# Patient Record
Sex: FEMALE | Employment: PART TIME | URBAN - METROPOLITAN AREA
[De-identification: names, ages, dates, MRNs, and addresses within clinical notes are randomized per-mention and may not be internally consistent; named-entity substitution may affect disease eponyms.]

---

## 2019-02-01 ENCOUNTER — TELEPHONE (OUTPATIENT)
Dept: OTHER | Facility: CLINIC | Age: 64
End: 2019-02-01

## 2019-02-01 NOTE — TELEPHONE ENCOUNTER
2/1/2019    Call Regarding Onboarding MVP OTHER     Attempt 1    Message on voicemail     Comments: NO DEP       Outreach   LR

## 2019-09-27 ENCOUNTER — THERAPY VISIT (OUTPATIENT)
Dept: PHYSICAL THERAPY | Facility: CLINIC | Age: 64
End: 2019-09-27
Payer: COMMERCIAL

## 2019-09-27 DIAGNOSIS — M54.50 CHRONIC LEFT-SIDED LOW BACK PAIN WITHOUT SCIATICA: ICD-10-CM

## 2019-09-27 DIAGNOSIS — G89.29 CHRONIC LEFT-SIDED LOW BACK PAIN WITHOUT SCIATICA: ICD-10-CM

## 2019-09-27 DIAGNOSIS — M25.552 HIP PAIN, LEFT: ICD-10-CM

## 2019-09-27 PROCEDURE — 97140 MANUAL THERAPY 1/> REGIONS: CPT | Mod: GP | Performed by: PHYSICAL THERAPIST

## 2019-09-27 PROCEDURE — 97161 PT EVAL LOW COMPLEX 20 MIN: CPT | Mod: GP | Performed by: PHYSICAL THERAPIST

## 2019-09-27 PROCEDURE — 97110 THERAPEUTIC EXERCISES: CPT | Mod: GP | Performed by: PHYSICAL THERAPIST

## 2019-09-27 ASSESSMENT — ACTIVITIES OF DAILY LIVING (ADL)
WALKING_DOWN_STEEP_HILLS: NO DIFFICULTY AT ALL
GOING_DOWN_1_FLIGHT_OF_STAIRS: NO DIFFICULTY AT ALL
STEPPING_UP_AND_DOWN_CURBS: NO DIFFICULTY AT ALL
GOING_UP_1_FLIGHT_OF_STAIRS: SLIGHT DIFFICULTY
HOS_ADL_HIGHEST_POTENTIAL_SCORE: 68
HOW_WOULD_YOU_RATE_YOUR_CURRENT_LEVEL_OF_FUNCTION_DURING_YOUR_USUAL_ACTIVITIES_OF_DAILY_LIVING_FROM_0_TO_100_WITH_100_BEING_YOUR_LEVEL_OF_FUNCTION_PRIOR_TO_YOUR_HIP_PROBLEM_AND_0_BEING_THE_INABILITY_TO_PERFORM_ANY_OF_YOUR_USUAL_DAILY_ACTIVITIES?: 98
ROLLING_OVER_IN_BED: MODERATE DIFFICULTY
WALKING_APPROXIMATELY_10_MINUTES: NO DIFFICULTY AT ALL
DEEP_SQUATTING: NO DIFFICULTY AT ALL
GETTING_INTO_AND_OUT_OF_AN_AVERAGE_CAR: NO DIFFICULTY AT ALL
WALKING_UP_STEEP_HILLS: SLIGHT DIFFICULTY
RECREATIONAL_ACTIVITIES: SLIGHT DIFFICULTY
STANDING_FOR_15_MINUTES: NO DIFFICULTY AT ALL
GETTING_INTO_AND_OUT_OF_A_BATHTUB: NO DIFFICULTY AT ALL
LIGHT_TO_MODERATE_WORK: SLIGHT DIFFICULTY
WALKING_INITIALLY: SLIGHT DIFFICULTY
HEAVY_WORK: SLIGHT DIFFICULTY
WALKING_15_MINUTES_OR_GREATER: NO DIFFICULTY AT ALL
HOS_ADL_ITEM_SCORE_TOTAL: 59
SITTING_FOR_15_MINUTES: NO DIFFICULTY AT ALL
PUTTING_ON_SOCKS_AND_SHOES: SLIGHT DIFFICULTY
HOS_ADL_SCORE(%): 86.76
HOS_ADL_COUNT: 17
TWISTING/PIVOTING_ON_INVOLVED_LEG: SLIGHT DIFFICULTY

## 2019-09-27 NOTE — PROGRESS NOTES
[facility] Initial Evaluation  Subjective:  The history is provided by the patient. No  was used.   Type of problem:  Left hip (low back)   Condition occurred with:  Insidious onset. This is a chronic condition   Problem details: I was stuck by a bee and was put on prednisone and my hip felt better.  I have some soreness in left hip for three years,  Low back pain for a couple of years ago. I had PT for my low back. So I inquired about the prednisone and therapy for back and hip.  With medication the pain has gotten better.  Hip more of a concern than low back..   Patient reports pain:  Greater trochanter and IT band (left low back, occasionally right leg tingling/numbness from sitting , tingling in toes). Radiates to:  Gluteals, hip and thigh (lateral). Associated symptoms:  Loss of motion/stiffness. Symptoms are exacerbated by weight bearing, ascending stairs, descending stairs and lying on extremity (getting up from sitting) Relieved by: movement for a while.    Alee Coyle being seen for left hip and left low back .   Problem began 9/20/2019. Where condition occurred: for unknown reasons.Problem occurred: unknown  and reported as 1/10 on pain scale. General health as reported by patient is excellent. Pertinent medical history includes:  Numbness/tingling. Other medical history details: positional vertigo.     Other surgery history details: hysterectomy.  Current medications:  Steroids. Other medications details: estrogen patch.   Primary job tasks include:  Computer work, prolonged sitting and prolonged standing.  Pain is described as burning and is constant. Pain is the same all the time. Since onset symptoms are gradually improving (with medication). Special tests:  X-ray (mild arthritis). Previous treatment includes physical therapy (low back ).    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    Home/work barriers:  house, big yard, and garden.  Red flags:  None as reported by patient.                      Objective:  Standing Alignment:    Cervical/Thoracic:  Forward head (fair sitting posture)  Shoulder/UE:  Rounded shoulders  Lumbar:  Lordosis decr            Gait:      Deviations:  Hip:  Decr dynamic control R    Flexibility/Screens:   Positive screens:  Hip and Lumbar    Lower Extremity:  Decreased left lower extremity flexibility:Hip Flexors; IT Band and Quadriceps    Decreased right lower extremity flexibility:  Hip Flexors; IT Band and Quadriceps  Spine:  Decreased left spine flexibility:  Quadratus Lumborum    Decreased right spine flexibility:  Quadratus Lumborum             Lumbar/SI Evaluation  ROM:    AROM Lumbar:   Flexion:        WFL no increase in pain   Ext:                    Moderate end range pain    Side Bend:        Left:     Right:   Rotation:           Left:     Right:   Side Glide:        Left:     Right:         Strength: TA 2/5                  Spinal Segmental Conclusions:     Level: Hypo noted at L5                                        Hip Evaluation  Hip PROM:    Flexion: Left: slightly limited   Right: WFL  Extension: Left: moderate tightness   Right: moderate tightntess  Abduction: Left: WFL    Right: WFL    Internal Rotation: Left: excessive    Right: excessive  External Rotation: Left: 60    Right: 45              Hip Strength:    Flexion:   Left: 5/5   Pain:  Right: 5/5   Pain:                    Extension:  Left: 5/5  Pain:Right: 5/5    Pain:    Abduction:  Left: 4/5     Pain:Right: 4+/5    Pain:    Internal Rotation:  Left: 4/5    Pain:Right: 5/5   Pain:  External Rotation:  Left: 5-/5   +  Pain:    Knee Flexion:  Left: 5/5   Pain:Right: 5/5   Pain:  Knee Extension:  Left: 5/5   Pain:Right: 5/5    Pain:        Hip Special Testing:    Left hip positive for the following special tests:  Yosef (tightness)  Left hip negative for the following special tests:  SLR   Right hip positive for the  following special tests:  Yosef (tightness)    Hip Palpation:    Left hip tenderness present at:   Greater Trachanter; IT Band; hip flexors; Adductors and Bursa    Functional Testing:        Core Strength:      Single leg bridge:   Left: 15/20 reps  Right: poor hip control/20 reps  % of Uninvolved:                    General     ROS    Assessment/Plan:    Patient is a 64 year old female with lumbar and left side hip complaints.    Patient has the following significant findings with corresponding treatment plan.                Diagnosis 1:  Left low back and left hip pain   Pain -  manual therapy, self management, education, directional preference exercise and home program  Decreased ROM/flexibility - manual therapy, therapeutic exercise, therapeutic activity and home program  Decreased joint mobility - manual therapy, therapeutic exercise, therapeutic activity and home program  Decreased strength - therapeutic exercise, therapeutic activities and home program  Impaired balance - neuro re-education, therapeutic activities and home program  Impaired muscle performance - neuro re-education and home program  Decreased function - therapeutic activities and home program    Therapy Evaluation Codes:   Cumulative Therapy Evaluation is: Low complexity.    Previous and current functional limitations:  (See Goal Flow Sheet for this information)    Short term and Long term goals: (See Goal Flow Sheet for this information)     Communication ability:  Patient appears to be able to clearly communicate and understand verbal and written communication and follow directions correctly.  Treatment Explanation - The following has been discussed with the patient:   RX ordered/plan of care  Possible risks and side effects  This patient would benefit from PT intervention to resume normal activities.   Rehab potential is good.    Frequency:  1 X week, once daily  Duration:  for 6 weeks  Discharge Plan:  Achieve all LTG.  Independent in home  treatment program.    Please refer to the daily flowsheet for treatment today, total treatment time and time spent performing 1:1 timed codes.

## 2019-09-27 NOTE — LETTER
[facility]  [address]  [place] [facility] [address]  [phone]    2019    Re: Alee Coyle   :   1955  MRN:  [id number]   REFERRING PHYSICIAN:   Elin North    [facility]    Date of Initial Evaluation:  2019  Visits:  Rxs Used: 1  Reason for Referral:     Chronic left-sided low back pain without sciatica  Hip pain, left    EVALUATION SUMMARY    Subjective:  The history is provided by the patient. No  was used.   Type of problem:  Left hip (low back)  Condition occurred with:  Insidious onset. This is a chronic condition   Problem details: I was stuck by a bee and was put on prednisone and my hip felt better.  I have some soreness in left hip for three years,  Low back pain for a couple of years ago. I had PT for my low back. So I inquired about the prednisone and therapy for back and hip.  With medication the pain has gotten better.  Hip more of a concern than low back..   Patient reports pain:  Greater trochanter and IT band (left low back, occasionally right leg tingling/numbness from sitting , tingling in toes). Radiates to:  Gluteals, hip and thigh (lateral). Associated symptoms:  Loss of motion/stiffness. Symptoms are exacerbated by weight bearing, ascending stairs, descending stairs and lying on extremity (getting up from sitting) Relieved by: movement for a while.  Alee Coyle being seen for left hip and left low back .   Problem began 2019. Where condition occurred: for unknown reasons.Problem occurred: unknown  and reported as 1/10 on pain scale. General health as reported by patient is excellent. Pertinent medical history includes:  Numbness/tingling. Other medical history details: positional vertigo.     Other surgery history details: hysterectomy. Current medications:  Steroids. Other medications details: estrogen  patch.   Primary job tasks include:  Computer work, prolonged sitting and prolonged standing.  Pain is described as burning and is constant. Pain is the same all the time. Since onset symptoms are gradually improving (with medication). Special tests:  X-ray (mild arthritis). Previous treatment includes physical therapy (low back ).    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  Home/work barriers: house, big yard, and garden.  Red flags:  None as reported by patient.                 Objective:  Standing Alignment:    Cervical/Thoracic:  Forward head (fair sitting posture)  Shoulder/UE:  Rounded shoulders  Lumbar:  Lordosis decr  Re: Alee Coyle   :   1955    Gait:    Deviations:  Hip:  Decr dynamic control R  Flexibility/Screens:   Positive screens:  Hip and Lumbar  Lower Extremity:  Decreased left lower extremity flexibility:Hip Flexors; IT Band and Quadriceps  Decreased right lower extremity flexibility:  Hip Flexors; IT Band and Quadriceps  Spine:  Decreased left spine flexibility:  Quadratus Lumborum  Decreased right spine flexibility:  Quadratus Lumborum    Lumbar/SI Evaluation  ROM:    AROM Lumbar:   Flexion:        WFL no increase in pain   Ext:                    Moderate end range pain    Side Bend:        Left:     Right:   Rotation:           Left:     Right:   Side Glide:        Left:     Right:       Strength: TA 2/5  Spinal Segmental Conclusions:   Level: Hypo noted at L5       Hip Evaluation  Hip PROM:    Flexion: Left: slightly limited   Right: WFL  Extension: Left: moderate tightness   Right: moderate tightntess  Abduction: Left: WFL    Right: WFL  Internal Rotation: Left: excessive    Right: excessive  External Rotation: Left: 60    Right: 45  Hip Strength:    Flexion:   Left: 5/5   Pain:  Right: 5/5   Pain:                  Extension:  Left: 5/5  Pain:Right: 5/5    Pain:    Abduction:  Left: 4/5     Pain:Right: 4+/5    Pain:  Internal  Rotation:  Left: 4/5    Pain:Right: 5/5   Pain:  External Rotation:  Left: 5-/5   +  Pain:    Knee Flexion:  Left: 5/5   Pain:Right: 5/5   Pain:  Knee Extension:  Left: 5/5   Pain:Right: 5/5    Pain:    Hip Special Testing:    Left hip positive for the following special tests:  Yosef (tightness)  Left hip negative for the following special tests:  SLR   Right hip positive for the following special tests:  Yosef (tightness)    Hip Palpation:    Left hip tenderness present at:   Greater Trachanter; IT Band; hip flexors; Adductors and Bursa  Functional Testing:    Core Strength:    Single leg bridge:   Left: 15/20 reps  Right: poor hip control/20 reps  % of Uninvolved:     Re: Alee Coyle   :   1955    Assessment/Plan:    Patient is a 64 year old female with lumbar and left side hip complaints.    Patient has the following significant findings with corresponding treatment plan.                Diagnosis 1:  Left low back and left hip pain   Pain -  manual therapy, self management, education, directional preference exercise and home program  Decreased ROM/flexibility - manual therapy, therapeutic exercise, therapeutic activity and home program  Decreased joint mobility - manual therapy, therapeutic exercise, therapeutic activity and home program  Decreased strength - therapeutic exercise, therapeutic activities and home program  Impaired balance - neuro re-education, therapeutic activities and home program  Impaired muscle performance - neuro re-education and home program  Decreased function - therapeutic activities and home program    Therapy Evaluation Codes:   Cumulative Therapy Evaluation is: Low complexity.    Previous and current functional limitations:  (See Goal Flow Sheet for this information)    Short term and Long term goals: (See Goal Flow Sheet for this information)     Communication ability:  Patient appears to be able to clearly communicate and understand verbal and written communication and  follow directions correctly.  Treatment Explanation - The following has been discussed with the patient:   RX ordered/plan of care  Possible risks and side effects  This patient would benefit from PT intervention to resume normal activities.   Rehab potential is good.    Frequency:  1 X week, once daily  Duration:  for 6 weeks  Discharge Plan:  Achieve all LTG.  Independent in home treatment program.    Thank you for your referral.    INQUIRIES  Therapist: Danuta Mcpherson PT  [facility] [place] [facility]  [address] [address]  Phone: [phone]  Fax: [phone]

## 2019-09-29 PROBLEM — M54.50 CHRONIC LEFT-SIDED LOW BACK PAIN WITHOUT SCIATICA: Status: ACTIVE | Noted: 2019-09-29

## 2019-09-29 PROBLEM — G89.29 CHRONIC LEFT-SIDED LOW BACK PAIN WITHOUT SCIATICA: Status: ACTIVE | Noted: 2019-09-29

## 2019-09-29 PROBLEM — M25.552 HIP PAIN, LEFT: Status: ACTIVE | Noted: 2019-09-29

## 2019-09-30 ENCOUNTER — THERAPY VISIT (OUTPATIENT)
Dept: PHYSICAL THERAPY | Facility: CLINIC | Age: 64
End: 2019-09-30
Payer: COMMERCIAL

## 2019-09-30 DIAGNOSIS — M25.552 HIP PAIN, LEFT: ICD-10-CM

## 2019-09-30 DIAGNOSIS — G89.29 CHRONIC LEFT-SIDED LOW BACK PAIN WITHOUT SCIATICA: ICD-10-CM

## 2019-09-30 DIAGNOSIS — M54.50 CHRONIC LEFT-SIDED LOW BACK PAIN WITHOUT SCIATICA: ICD-10-CM

## 2019-09-30 PROCEDURE — 97140 MANUAL THERAPY 1/> REGIONS: CPT | Mod: GP | Performed by: PHYSICAL THERAPIST

## 2019-09-30 PROCEDURE — 97110 THERAPEUTIC EXERCISES: CPT | Mod: GP | Performed by: PHYSICAL THERAPIST

## 2019-09-30 PROCEDURE — 97112 NEUROMUSCULAR REEDUCATION: CPT | Mod: GP | Performed by: PHYSICAL THERAPIST

## 2019-10-09 ENCOUNTER — THERAPY VISIT (OUTPATIENT)
Dept: PHYSICAL THERAPY | Facility: CLINIC | Age: 64
End: 2019-10-09
Payer: COMMERCIAL

## 2019-10-09 DIAGNOSIS — G89.29 CHRONIC LEFT-SIDED LOW BACK PAIN WITHOUT SCIATICA: ICD-10-CM

## 2019-10-09 DIAGNOSIS — M25.552 HIP PAIN, LEFT: ICD-10-CM

## 2019-10-09 DIAGNOSIS — M54.50 CHRONIC LEFT-SIDED LOW BACK PAIN WITHOUT SCIATICA: ICD-10-CM

## 2019-10-09 PROCEDURE — 97110 THERAPEUTIC EXERCISES: CPT | Mod: GP | Performed by: PHYSICAL THERAPY ASSISTANT

## 2019-10-09 PROCEDURE — 97140 MANUAL THERAPY 1/> REGIONS: CPT | Mod: GP | Performed by: PHYSICAL THERAPY ASSISTANT

## 2019-10-16 ENCOUNTER — THERAPY VISIT (OUTPATIENT)
Dept: PHYSICAL THERAPY | Facility: CLINIC | Age: 64
End: 2019-10-16
Payer: COMMERCIAL

## 2019-10-16 DIAGNOSIS — G89.29 CHRONIC LEFT-SIDED LOW BACK PAIN WITHOUT SCIATICA: ICD-10-CM

## 2019-10-16 DIAGNOSIS — M25.552 HIP PAIN, LEFT: ICD-10-CM

## 2019-10-16 DIAGNOSIS — M54.50 CHRONIC LEFT-SIDED LOW BACK PAIN WITHOUT SCIATICA: ICD-10-CM

## 2019-10-16 PROCEDURE — 97110 THERAPEUTIC EXERCISES: CPT | Mod: GP | Performed by: PHYSICAL THERAPY ASSISTANT

## 2019-10-16 PROCEDURE — 97140 MANUAL THERAPY 1/> REGIONS: CPT | Mod: GP | Performed by: PHYSICAL THERAPY ASSISTANT

## 2019-10-23 ENCOUNTER — THERAPY VISIT (OUTPATIENT)
Dept: PHYSICAL THERAPY | Facility: CLINIC | Age: 64
End: 2019-10-23
Payer: COMMERCIAL

## 2019-10-23 DIAGNOSIS — G89.29 CHRONIC LEFT-SIDED LOW BACK PAIN WITHOUT SCIATICA: ICD-10-CM

## 2019-10-23 DIAGNOSIS — M54.50 CHRONIC LEFT-SIDED LOW BACK PAIN WITHOUT SCIATICA: ICD-10-CM

## 2019-10-23 DIAGNOSIS — M25.552 HIP PAIN, LEFT: ICD-10-CM

## 2019-10-23 PROCEDURE — 97140 MANUAL THERAPY 1/> REGIONS: CPT | Mod: GP | Performed by: PHYSICAL THERAPY ASSISTANT

## 2019-10-23 PROCEDURE — 97110 THERAPEUTIC EXERCISES: CPT | Mod: GP | Performed by: PHYSICAL THERAPY ASSISTANT

## 2019-10-30 ENCOUNTER — THERAPY VISIT (OUTPATIENT)
Dept: PHYSICAL THERAPY | Facility: CLINIC | Age: 64
End: 2019-10-30
Payer: COMMERCIAL

## 2019-10-30 DIAGNOSIS — M54.50 CHRONIC LEFT-SIDED LOW BACK PAIN WITHOUT SCIATICA: ICD-10-CM

## 2019-10-30 DIAGNOSIS — M25.552 HIP PAIN, LEFT: ICD-10-CM

## 2019-10-30 DIAGNOSIS — G89.29 CHRONIC LEFT-SIDED LOW BACK PAIN WITHOUT SCIATICA: ICD-10-CM

## 2019-10-30 PROCEDURE — 97110 THERAPEUTIC EXERCISES: CPT | Mod: GP | Performed by: PHYSICAL THERAPY ASSISTANT

## 2019-10-30 PROCEDURE — 97112 NEUROMUSCULAR REEDUCATION: CPT | Mod: GP | Performed by: PHYSICAL THERAPY ASSISTANT

## 2019-10-30 ASSESSMENT — ACTIVITIES OF DAILY LIVING (ADL)
DEEP_SQUATTING: SLIGHT DIFFICULTY
TWISTING/PIVOTING_ON_INVOLVED_LEG: SLIGHT DIFFICULTY
WALKING_APPROXIMATELY_10_MINUTES: SLIGHT DIFFICULTY
STEPPING_UP_AND_DOWN_CURBS: NO DIFFICULTY AT ALL
HOS_ADL_SCORE(%): 82.35
GOING_DOWN_1_FLIGHT_OF_STAIRS: NO DIFFICULTY AT ALL
HOW_WOULD_YOU_RATE_YOUR_CURRENT_LEVEL_OF_FUNCTION_DURING_YOUR_USUAL_ACTIVITIES_OF_DAILY_LIVING_FROM_0_TO_100_WITH_100_BEING_YOUR_LEVEL_OF_FUNCTION_PRIOR_TO_YOUR_HIP_PROBLEM_AND_0_BEING_THE_INABILITY_TO_PERFORM_ANY_OF_YOUR_USUAL_DAILY_ACTIVITIES?: 90
HOS_ADL_ITEM_SCORE_TOTAL: 56
RECREATIONAL_ACTIVITIES: SLIGHT DIFFICULTY
GOING_UP_1_FLIGHT_OF_STAIRS: SLIGHT DIFFICULTY
GETTING_INTO_AND_OUT_OF_AN_AVERAGE_CAR: SLIGHT DIFFICULTY
HEAVY_WORK: SLIGHT DIFFICULTY
HOS_ADL_COUNT: 17
PUTTING_ON_SOCKS_AND_SHOES: NO DIFFICULTY AT ALL
ROLLING_OVER_IN_BED: SLIGHT DIFFICULTY
LIGHT_TO_MODERATE_WORK: SLIGHT DIFFICULTY
WALKING_15_MINUTES_OR_GREATER: SLIGHT DIFFICULTY
STANDING_FOR_15_MINUTES: NO DIFFICULTY AT ALL
WALKING_INITIALLY: SLIGHT DIFFICULTY
WALKING_DOWN_STEEP_HILLS: NO DIFFICULTY AT ALL
GETTING_INTO_AND_OUT_OF_A_BATHTUB: NO DIFFICULTY AT ALL
SITTING_FOR_15_MINUTES: SLIGHT DIFFICULTY
WALKING_UP_STEEP_HILLS: SLIGHT DIFFICULTY
HOS_ADL_HIGHEST_POTENTIAL_SCORE: 68

## 2019-10-30 NOTE — LETTER
[facility] PHYSICAL THERAP[facility]  [address]  [facility] [address]  [phone]    2019    Re: Alee Coyle   :   1955  MRN:  [id number]   REFERRING PHYSICIAN:   Elin North    [facility] PHYSICAL THERAP[facility]    Date of Initial Evaluation: 2019  Visits:  Rxs Used: 6  Reason for Referral:     Chronic left-sided low back pain without sciatica  Hip pain, left    PROGRESS  REPORT  Progress reporting period is from 19 to 10/30/19.      SUBJECTIVE  Subjective changes noted by patient:   Pt has been seen for 6 PT sessions. Symptoms are variable. Will perform EIS throughout work day to decrease symptoms. Pain tends to occur upon rising from chair after prolonged sitting or after prolonged walking. Pain located L low back to lateral L thigh. Pt feels symptoms overall are less intense and frequent since starting PT.    Current Pain level: (varies 1-3/10  )    Initial Pain level: 3/10   Changes in function:  Yes (See Goal flowsheet attached for changes in current functional level)     Adverse reaction to treatment or activity: activity - prolonged sitting/walking increases pain      OBJECTIVE  Changes noted in objective findings:   LROM: flex no loss, ext min loss that improves to WNL at end of session(EIL and lumbar stabilization ex).   TA  strength 2+/5.   Gait antalgic with decreased stance and push off on L LE.   Pain present with ascending stairs occasionally.   No change in hip outcome survey.      ASSESSMENT/PLAN  Updated problem list and treatment plan: Diagnosis 1:  Low back pain with Left hip bursitis  Pain -  manual therapy, self management, education, directional preference exercise and home program  Decreased ROM/flexibility - manual therapy, therapeutic exercise, therapeutic activity and home program  Decreased joint mobility - manual therapy, therapeutic exercise, therapeutic activity  and home program  Re: Alee Coyle   :   1955    Decreased strength - therapeutic exercise, therapeutic activities and home program  Decreased proprioception - neuro re-education, therapeutic activities and home program  Impaired gait - gait training and home program  Decreased function - therapeutic activities and home program  STG/LTGs have been met:  Yes (See Goal flow sheet completed today.)  Progress toward STG/LTGs have been made:  Yes (See Goal flow sheet completed today.)  Assessment of Progress: The patient's condition is improving.  Patient is meeting short term goals and is progressing towards long term goals.  Self Management Plans:  Patient has been instructed in a home treatment program.  Patient  has been instructed in self management of symptoms.  I have re-evaluated this patient and find that the nature, scope, duration and intensity of the therapy is appropriate for the medical condition of the patient.  Alee continues to require the following intervention to meet STG and LT's:  PT    Recommendations:  This patient would benefit from continued therapy.     Frequency:  1 X week, once daily  Duration:  for 6 weeks    Patient continues to need guidance to progress with exercises and functional activity.  The progress note/discharge summary was written in collaboration with and reviewed by the physical therapist.      Thank you for your referral.    INQUIRIES  Therapist: Danuta Mcpherson, PT  [facility] [place] [facility]  [address] [address]  Phone: [phone]  Fax: [phone]

## 2019-11-01 NOTE — PROGRESS NOTES
PROGRESS  REPORT    Progress reporting period is from 9/27/19 to 10/30/19.      SUBJECTIVE  Subjective changes noted by patient:   Pt has been seen for 6 PT sessions. Symptoms are variable. Will perform EIS throughout work day to decrease symptoms. Pain tends to occur upon rising from chair after prolonged sitting or after prolonged walking. Pain located L low back to lateral L thigh. Pt feels symptoms overall are less intense and frequent since starting PT.    Current Pain level: (varies 1-3/10  )    Initial Pain level: 3/10   Changes in function:  Yes (See Goal flowsheet attached for changes in current functional level)     Adverse reaction to treatment or activity: activity - prolonged sitting/walking increases pain      OBJECTIVE  Changes noted in objective findings:   LROM: flex no loss, ext min loss that improves to WNL at end of session(EIL and lumbar stabilization ex).   TA  strength 2+/5.   Gait antalgic with decreased stance and push off on L LE.   Pain present with ascending stairs occasionally.   No change in hip outcome survey.

## 2019-11-04 NOTE — PROGRESS NOTES
Subjective:  HPI                    Objective:  System    Physical Exam    General     ROS    Assessment/Plan:    ASSESSMENT/PLAN  Updated problem list and treatment plan: Diagnosis 1:  Low back pain with Left hip bursitis  Pain -  manual therapy, self management, education, directional preference exercise and home program  Decreased ROM/flexibility - manual therapy, therapeutic exercise, therapeutic activity and home program  Decreased joint mobility - manual therapy, therapeutic exercise, therapeutic activity and home program  Decreased strength - therapeutic exercise, therapeutic activities and home program  Decreased proprioception - neuro re-education, therapeutic activities and home program  Impaired gait - gait training and home program  Decreased function - therapeutic activities and home program  STG/LTGs have been met:  Yes (See Goal flow sheet completed today.)  Progress toward STG/LTGs have been made:  Yes (See Goal flow sheet completed today.)  Assessment of Progress: The patient's condition is improving.  Patient is meeting short term goals and is progressing towards long term goals.  Self Management Plans:  Patient has been instructed in a home treatment program.  Patient  has been instructed in self management of symptoms.  I have re-evaluated this patient and find that the nature, scope, duration and intensity of the therapy is appropriate for the medical condition of the patient.  Alee continues to require the following intervention to meet STG and LT's:  PT    Recommendations:  This patient would benefit from continued therapy.     Frequency:  1 X week, once daily  Duration:  for 6 weeks      Patient continues to need guidance to progress with exercises and functional activity.  The progress note/discharge summary was written in collaboration with and reviewed by the physical therapist.    Please refer to the daily flowsheet for treatment today, total treatment time and time spent performing 1:1  timed codes.

## 2019-11-05 ENCOUNTER — HEALTH MAINTENANCE LETTER (OUTPATIENT)
Age: 64
End: 2019-11-05

## 2019-11-15 ENCOUNTER — THERAPY VISIT (OUTPATIENT)
Dept: PHYSICAL THERAPY | Facility: CLINIC | Age: 64
End: 2019-11-15
Payer: COMMERCIAL

## 2019-11-15 DIAGNOSIS — M54.50 CHRONIC LEFT-SIDED LOW BACK PAIN WITHOUT SCIATICA: ICD-10-CM

## 2019-11-15 DIAGNOSIS — G89.29 CHRONIC LEFT-SIDED LOW BACK PAIN WITHOUT SCIATICA: ICD-10-CM

## 2019-11-15 DIAGNOSIS — M25.552 HIP PAIN, LEFT: ICD-10-CM

## 2019-11-15 PROCEDURE — 97110 THERAPEUTIC EXERCISES: CPT | Mod: GP | Performed by: PHYSICAL THERAPY ASSISTANT

## 2019-11-15 PROCEDURE — 97112 NEUROMUSCULAR REEDUCATION: CPT | Mod: GP | Performed by: PHYSICAL THERAPY ASSISTANT

## 2019-12-04 ENCOUNTER — THERAPY VISIT (OUTPATIENT)
Dept: PHYSICAL THERAPY | Facility: CLINIC | Age: 64
End: 2019-12-04
Payer: COMMERCIAL

## 2019-12-04 DIAGNOSIS — M54.50 CHRONIC LEFT-SIDED LOW BACK PAIN WITHOUT SCIATICA: ICD-10-CM

## 2019-12-04 DIAGNOSIS — M25.552 HIP PAIN, LEFT: ICD-10-CM

## 2019-12-04 DIAGNOSIS — G89.29 CHRONIC LEFT-SIDED LOW BACK PAIN WITHOUT SCIATICA: ICD-10-CM

## 2019-12-04 PROCEDURE — 97110 THERAPEUTIC EXERCISES: CPT | Mod: GP | Performed by: PHYSICAL THERAPY ASSISTANT

## 2019-12-04 NOTE — PROGRESS NOTES
tI have provided on-site observation of the PTA and confirm the treatment is appropriate and the plan of care is being followed.

## 2019-12-20 ENCOUNTER — THERAPY VISIT (OUTPATIENT)
Dept: PHYSICAL THERAPY | Facility: CLINIC | Age: 64
End: 2019-12-20
Payer: COMMERCIAL

## 2019-12-20 DIAGNOSIS — M25.552 HIP PAIN, LEFT: ICD-10-CM

## 2019-12-20 DIAGNOSIS — M54.50 CHRONIC LEFT-SIDED LOW BACK PAIN WITHOUT SCIATICA: ICD-10-CM

## 2019-12-20 DIAGNOSIS — G89.29 CHRONIC LEFT-SIDED LOW BACK PAIN WITHOUT SCIATICA: ICD-10-CM

## 2019-12-20 PROCEDURE — 97110 THERAPEUTIC EXERCISES: CPT | Mod: GP | Performed by: PHYSICAL THERAPY ASSISTANT

## 2019-12-20 ASSESSMENT — ACTIVITIES OF DAILY LIVING (ADL)
SITTING_FOR_15_MINUTES: NO DIFFICULTY AT ALL
WALKING_APPROXIMATELY_10_MINUTES: NO DIFFICULTY AT ALL
GETTING_INTO_AND_OUT_OF_A_BATHTUB: NO DIFFICULTY AT ALL
TWISTING/PIVOTING_ON_INVOLVED_LEG: NO DIFFICULTY AT ALL
HOS_ADL_HIGHEST_POTENTIAL_SCORE: 68
WALKING_15_MINUTES_OR_GREATER: NO DIFFICULTY AT ALL
HOS_ADL_SCORE(%): 97.06
GOING_UP_1_FLIGHT_OF_STAIRS: NO DIFFICULTY AT ALL
HOW_WOULD_YOU_RATE_YOUR_CURRENT_LEVEL_OF_FUNCTION_DURING_YOUR_USUAL_ACTIVITIES_OF_DAILY_LIVING_FROM_0_TO_100_WITH_100_BEING_YOUR_LEVEL_OF_FUNCTION_PRIOR_TO_YOUR_HIP_PROBLEM_AND_0_BEING_THE_INABILITY_TO_PERFORM_ANY_OF_YOUR_USUAL_DAILY_ACTIVITIES?: 98
WALKING_DOWN_STEEP_HILLS: NO DIFFICULTY AT ALL
DEEP_SQUATTING: NO DIFFICULTY AT ALL
RECREATIONAL_ACTIVITIES: NO DIFFICULTY AT ALL
WALKING_INITIALLY: NO DIFFICULTY AT ALL
STEPPING_UP_AND_DOWN_CURBS: NO DIFFICULTY AT ALL
WALKING_UP_STEEP_HILLS: NO DIFFICULTY AT ALL
ROLLING_OVER_IN_BED: NO DIFFICULTY AT ALL
HOS_ADL_ITEM_SCORE_TOTAL: 66
LIGHT_TO_MODERATE_WORK: NO DIFFICULTY AT ALL
HOS_ADL_COUNT: 17
GOING_DOWN_1_FLIGHT_OF_STAIRS: NO DIFFICULTY AT ALL
PUTTING_ON_SOCKS_AND_SHOES: NO DIFFICULTY AT ALL
STANDING_FOR_15_MINUTES: NO DIFFICULTY AT ALL
HEAVY_WORK: SLIGHT DIFFICULTY
GETTING_INTO_AND_OUT_OF_AN_AVERAGE_CAR: SLIGHT DIFFICULTY

## 2019-12-20 NOTE — PROGRESS NOTES
DISCHARGE REPORT    Progress reporting period is from 9/27/19 to 12/20/19.      SUBJECTIVE  Subjective changes noted by patient:   Pt has been seen for 9 PT sessions, pt pleased with decreased pain level and increased functional activity tolerance. Pt reports 97% improved. Has not needed advil for pain management. Increased tolerance with prolonged sitting and stair climbing. Current symtpoms intermittent along L gluteal region.    Current Pain level: 0/10(0-1/10 with WBing activity )    Initial Pain level: 3/10   Changes in function:  Yes (See Goal flowsheet attached for changes in current functional level)     Adverse reaction to treatment or activity: None     OBJECTIVE  Changes noted in objective findings:      L hip and lumbar AROM WNL, L hip ER and extension were restricted.   MMT L hip grossly 5/5 except for hip abd 4+/5.   Gait normal on level ground, reciprocal gait on stairs.   SLS 60 seconds bilaterally.   Oswestry has improved from 14-8%, Natanael remains 1 (low risk), Hip outcome has improved from 82-97%.

## 2019-12-20 NOTE — LETTER
[facility] PHYSICAL THERAPY  [address]  [place] [address]  [phone]    2019    Re: Alee Coyle   :   1955  MRN:  [id number]   REFERRING PHYSICIAN:   Eiln North    [facility] PHYSICAL THERAPY    Date of Initial Evaluation:  2019  Visits:  Rxs Used: 9  Reason for Referral:     Chronic left-sided low back pain without sciatica  Hip pain, left    DISCHARGE REPORT  Progress reporting period is from 19 to 19.      SUBJECTIVE  Subjective changes noted by patient:   Pt has been seen for 9 PT sessions, pt pleased with decreased pain level and increased functional activity tolerance. Pt reports 97% improved. Has not needed advil for pain management. Increased tolerance with prolonged sitting and stair climbing. Current symtpoms intermittent along L gluteal region.    Current Pain level: 0/10(0-1/10 with WBing activity )    Initial Pain level: 3/10   Changes in function:  Yes (See Goal flowsheet attached for changes in current functional level)     Adverse reaction to treatment or activity: None   HPI  Oswestry Score: 8 %       OBJECTIVE  Changes noted in objective findings:      L hip and lumbar AROM WNL, L hip ER and extension were restricted.   MMT L hip grossly 5/5 except for hip abd 4+/5.   Gait normal on level ground, reciprocal gait on stairs.   SLS 60 seconds bilaterally.   Oswestry has improved from 14-8%, Natanael remains 1 (low risk), Hip outcome has improved from 82-97%.      ASSESSMENT/PLAN  Updated problem list and treatment plan: Diagnosis 1:  Left low back/left hip pain   Pain -  self management and home program  Decreased strength - home program  Decreased function - home program  STG/LTGs have been met:  Yes (See Goal flow sheet completed today.)    Re: Alee Coyle   :   1955      Progress toward STG/LTGs have been made:  Yes (See Goal flow sheet  completed today.)  Assessment of Progress: The patient's condition is improving.  The patient has met all of their long term goals.  Self Management Plans:  Patient is independent in a home treatment program.  Patient is independent in self management of symptoms.  I have re-evaluated this patient and find that the nature, scope, duration and intensity of the therapy is appropriate for the medical condition of the patient.  Alee continues to require the following intervention to meet STG and LT's:  PT intervention is no longer required to meet STG/LTG.    Recommendations:  This patient is ready to be discharged from therapy and continue their home treatment program.  Thank you for the opportunity of working with this motivated individual.  The progress note/discharge summary was written in collaboration with and reviewed by the physical therapist.    Thank you for your referral.      INQUIRIES  Therapist: Danuta Mcpherson PT  [facility] [place] [facility]  [address] [address]  Phone: [phone]  Fax: [phone]

## 2019-12-22 PROBLEM — M25.552 HIP PAIN, LEFT: Status: RESOLVED | Noted: 2019-09-29 | Resolved: 2019-12-22

## 2019-12-22 PROBLEM — M54.50 CHRONIC LEFT-SIDED LOW BACK PAIN WITHOUT SCIATICA: Status: RESOLVED | Noted: 2019-09-29 | Resolved: 2019-12-22

## 2019-12-22 PROBLEM — G89.29 CHRONIC LEFT-SIDED LOW BACK PAIN WITHOUT SCIATICA: Status: RESOLVED | Noted: 2019-09-29 | Resolved: 2019-12-22

## 2019-12-22 NOTE — PROGRESS NOTES
Subjective:  HPI  Oswestry Score: 8 %                 Objective:  System    Physical Exam    General     ROS    Assessment/Plan:    ASSESSMENT/PLAN  Updated problem list and treatment plan: Diagnosis 1:  Left low back/left hip pain   Pain -  self management and home program  Decreased strength - home program  Decreased function - home program  STG/LTGs have been met:  Yes (See Goal flow sheet completed today.)  Progress toward STG/LTGs have been made:  Yes (See Goal flow sheet completed today.)  Assessment of Progress: The patient's condition is improving.  The patient has met all of their long term goals.  Self Management Plans:  Patient is independent in a home treatment program.  Patient is independent in self management of symptoms.  I have re-evaluated this patient and find that the nature, scope, duration and intensity of the therapy is appropriate for the medical condition of the patient.  Alee continues to require the following intervention to meet STG and LT's:  PT intervention is no longer required to meet STG/LTG.    Recommendations:  This patient is ready to be discharged from therapy and continue their home treatment program.  Thank you for the opportunity of working with this motivated individual.  The progress note/discharge summary was written in collaboration with and reviewed by the physical therapist.    Please refer to the daily flowsheet for treatment today, total treatment time and time spent performing 1:1 timed codes.

## 2020-11-22 ENCOUNTER — HEALTH MAINTENANCE LETTER (OUTPATIENT)
Age: 65
End: 2020-11-22

## 2021-09-19 ENCOUNTER — HEALTH MAINTENANCE LETTER (OUTPATIENT)
Age: 66
End: 2021-09-19

## 2021-11-14 ENCOUNTER — HEALTH MAINTENANCE LETTER (OUTPATIENT)
Age: 66
End: 2021-11-14

## 2022-11-20 ENCOUNTER — HEALTH MAINTENANCE LETTER (OUTPATIENT)
Age: 67
End: 2022-11-20

## 2023-09-16 ENCOUNTER — HEALTH MAINTENANCE LETTER (OUTPATIENT)
Age: 68
End: 2023-09-16

## 2023-11-25 ENCOUNTER — HEALTH MAINTENANCE LETTER (OUTPATIENT)
Age: 68
End: 2023-11-25

## 2024-12-04 ENCOUNTER — TRANSCRIBE ORDERS (OUTPATIENT)
Dept: OTHER | Age: 69
End: 2024-12-04

## 2024-12-04 DIAGNOSIS — H04.562 STENOSIS OF LEFT LACRIMAL PUNCTUM: Primary | ICD-10-CM

## 2025-01-04 ENCOUNTER — HEALTH MAINTENANCE LETTER (OUTPATIENT)
Age: 70
End: 2025-01-04

## 2025-02-03 ENCOUNTER — TELEPHONE (OUTPATIENT)
Dept: OPHTHALMOLOGY | Facility: CLINIC | Age: 70
End: 2025-02-03
Payer: COMMERCIAL

## 2025-02-03 NOTE — TELEPHONE ENCOUNTER
[facility] Call Center    Phone Message    May a detailed message be left on voicemail: yes     Reason for Call: Other: Patient would like to know what will happen in her initial visit on 3/5/25.  Please call.  Thank you.       Action Taken: Message routed to:  [facility] ([facility]): Ophthalmology    Travel Screening: Not Applicable     Date of Service:

## 2025-02-03 NOTE — TELEPHONE ENCOUNTER
Spoke with patient, discussed that initial visit is for consult and evaluation of the tear duct. If surgery is necessary, it will be scheduled at a later date    KYRA Chapman, 10:54 AM 02/03/2025

## 2025-03-05 ENCOUNTER — OFFICE VISIT (OUTPATIENT)
Dept: OPHTHALMOLOGY | Facility: CLINIC | Age: 70
End: 2025-03-05
Payer: COMMERCIAL

## 2025-03-05 DIAGNOSIS — H04.562 STENOSIS OF LEFT LACRIMAL PUNCTUM: Primary | ICD-10-CM

## 2025-03-05 DIAGNOSIS — H04.202 LEFT EPIPHORA: ICD-10-CM

## 2025-03-05 PROCEDURE — 68440 SNIP INC LACRIMAL PUNCTUM: CPT | Mod: LT | Performed by: OPHTHALMOLOGY

## 2025-03-05 PROCEDURE — 99207 PR BUNDLED PROCEDURE IN GLOBAL PKG: CPT | Performed by: OPHTHALMOLOGY

## 2025-03-05 PROCEDURE — 68815 PROBE NASOLACRIMAL DUCT: CPT | Mod: LT | Performed by: OPHTHALMOLOGY

## 2025-03-05 RX ORDER — VITAMIN B COMPLEX
1 TABLET ORAL DAILY
COMMUNITY

## 2025-03-05 RX ORDER — B-COMPLEX WITH VITAMIN C
1 TABLET ORAL DAILY
COMMUNITY

## 2025-03-05 RX ORDER — IBUPROFEN 200 MG
600 TABLET ORAL EVERY 4 HOURS PRN
COMMUNITY

## 2025-03-05 RX ORDER — MULTIVITAMIN WITH IRON
1 TABLET ORAL DAILY
COMMUNITY

## 2025-03-05 RX ORDER — TOBRAMYCIN AND DEXAMETHASONE 3; 1 MG/ML; MG/ML
1 SUSPENSION/ DROPS OPHTHALMIC ONCE
Status: COMPLETED | OUTPATIENT
Start: 2025-03-05 | End: 2025-03-05

## 2025-03-05 RX ADMIN — TOBRAMYCIN AND DEXAMETHASONE 1 DROP: 3; 1 SUSPENSION/ DROPS OPHTHALMIC at 13:09

## 2025-03-05 ASSESSMENT — CONF VISUAL FIELD
OS_INFERIOR_NASAL_RESTRICTION: 0
OD_INFERIOR_NASAL_RESTRICTION: 0
OD_INFERIOR_TEMPORAL_RESTRICTION: 0
OS_INFERIOR_TEMPORAL_RESTRICTION: 0
OD_SUPERIOR_NASAL_RESTRICTION: 0
OS_SUPERIOR_TEMPORAL_RESTRICTION: 0
OS_SUPERIOR_NASAL_RESTRICTION: 0
OD_NORMAL: 1
METHOD: COUNTING FINGERS
OD_SUPERIOR_TEMPORAL_RESTRICTION: 0
OS_NORMAL: 1

## 2025-03-05 ASSESSMENT — VISUAL ACUITY
OD_SC+: -2
METHOD: SNELLEN - LINEAR
OD_SC: 20/25
OS_SC: 20/25
OS_SC+: -3

## 2025-03-05 ASSESSMENT — TONOMETRY
IOP_METHOD: ICARE
OS_IOP_MMHG: 14
OD_IOP_MMHG: 11

## 2025-03-05 NOTE — PATIENT INSTRUCTIONS
Use the prescribed eye drop three times a day for ten days then stop.   You can use ice around the left eye four times a day for 5 minutes each for the first two days to minimize bruising. Warm compresses can be used after that to help break up bruising.

## 2025-03-05 NOTE — LETTER
" 3/5/2025         RE:  :  MRN: Alee Coyle  1955  [id number]     Dear Dr. Drummond,    Thank you for asking me to see your patient, Alee Coyle, for an oculoplastic   consultation.  My assessment and plan are below.  For further details, please see my attached clinic note.      Oculoplastic Clinic New Patient    Patient: Alee Coyle MRN# [id number]   YOB: 1955 Age: 69 year old   Date of Visit: Mar 5, 2025    CC: Tearing    Chief Complaint(s) and History of Present Illness(es)     Tearing Left Eye            Laterality: left eye          Comments    Pt referred by Dr. Drummond for evaluation of tearing of the left eye with   possible punctual stenosis. Pt states that the tears pool in the eye, and   then run down her cheek. She has been aware of this for the last 2 years.     No previous ocular surgeries. No heart conditions, does have a \"spot on   lung\" that is being watched. Vit D, no other blood thinners.               HPI:     Alee Coyle is a 69 year old female who has noted tearing from the left eye for several years. There is no history of trauma to the face, significant sinusitis, or sinus tumors. The eye does not feel dry and irritated. The tears run down the cheeks, and obscure vision interfering with activities of daily living.     Smoking: Non-smoker       Irrigation and Nasal Endoscopy:     PROCEDURE: Dilation and irrigation left eye  SURGEON: Crista Agrawal MD  ANESTHESIA: Topical  COMPLICATIONS: None  EBL: Nil  FINDINGS: n/a% block right eye,  significant punctal stenosis left eye, but patent canaliculus and nasolacrimal duct.    The patient was placed supine in the examining chair. Proparacaine was placed in the eye.  The punctum was anesthesized with 2% lidocaine on a cotton tip applicator.  The punctum was dilated with punctal dilator. The 25 gauge lacrimal cannula was placed in the proximal canaliculus and the lacrimal system irrigated with water.  The patient tolerated the " procedure well.   I was present for the entire procedure - Crista Agrawal MD             Assessment and Plan:     1. Left epiphora    2. Stenosis of left lacrimal punctum      I agree with Dr. Drummond that her epiphora is secondary to punctal stenosis on the left. We discussed options. Plan left lower eyelid punctoplasty and silicone stent. We can do that in clinic today and have her return in 3-4 weeeks for stent removal.     After obtaining informed consent the patient was brought to the minor procedure room and laid supine on the table.  1% lidocaine with epinephrine was infiltrated into the left lower eyelid in the region of the punctum.  She was prepped and draped in typical sterile fashion.  Attention was directed to the left side.  A stretch punctal plasty was performed using serially enlarging punctal dilators.  A mini [place] mono canalicular stent was then threaded through the lower punctum into the canaliculus and into the nasolacrimal sac and duct.  The footplate of the stent was then seated into the lower punctum.  She tolerated the procedure well.  Tobradex drops were administered. I was present for the entire procedure. Crista Agrawal MD          Again, thank you for allowing me to participate in the care of your patient.      Sincerely,    Crista Agrawal MD  Department of Ophthalmology and Visual Neurosciences  [facility]    CC: [facility]  [address] [place] [address]  Via Fax: [phone]

## 2025-03-05 NOTE — PROGRESS NOTES
"     Oculoplastic Clinic New Patient    Patient: Alee Coyle MRN# [id number]   YOB: 1955 Age: 69 year old   Date of Visit: Mar 5, 2025    CC: Tearing    Chief Complaint(s) and History of Present Illness(es)     Tearing Left Eye            Laterality: left eye          Comments    Pt referred by Dr. Drummond for evaluation of tearing of the left eye with   possible punctual stenosis. Pt states that the tears pool in the eye, and   then run down her cheek. She has been aware of this for the last 2 years.     No previous ocular surgeries. No heart conditions, does have a \"spot on   lung\" that is being watched. Vit D, no other blood thinners.               HPI:     Alee Coyle is a 69 year old female who has noted tearing from the left eye for several years. There is no history of trauma to the face, significant sinusitis, or sinus tumors. The eye does not feel dry and irritated. The tears run down the cheeks, and obscure vision interfering with activities of daily living.     Smoking: Non-smoker       Irrigation and Nasal Endoscopy:     PROCEDURE: Dilation and irrigation left eye  SURGEON: Crista Agrawal MD  ANESTHESIA: Topical  COMPLICATIONS: None  EBL: Nil  FINDINGS: n/a% block right eye,  significant punctal stenosis left eye, but patent canaliculus and nasolacrimal duct.    The patient was placed supine in the examining chair. Proparacaine was placed in the eye.  The punctum was anesthesized with 2% lidocaine on a cotton tip applicator.  The punctum was dilated with punctal dilator. The 25 gauge lacrimal cannula was placed in the proximal canaliculus and the lacrimal system irrigated with water.  The patient tolerated the procedure well.   I was present for the entire procedure - Crista Agrawal MD             Assessment and Plan:     1. Left epiphora    2. Stenosis of left lacrimal punctum      I agree with Dr. Drummond that her epiphora is secondary to punctal stenosis on the left. We discussed " options. Plan left lower eyelid punctoplasty and silicone stent. We can do that in clinic today and have her return in 3-4 weeeks for stent removal.     After obtaining informed consent the patient was brought to the minor procedure room and laid supine on the table.  1% lidocaine with epinephrine was infiltrated into the left lower eyelid in the region of the punctum.  She was prepped and draped in typical sterile fashion.  Attention was directed to the left side.  A stretch punctal plasty was performed using serially enlarging punctal dilators.  A mini [place] mono canalicular stent was then threaded through the lower punctum into the canaliculus and into the nasolacrimal sac and duct.  The footplate of the stent was then seated into the lower punctum.  She tolerated the procedure well.  Tobradex drops were administered. I was present for the entire procedure. Critsa Agrawal MD           Attending Physician Attestation: Complete documentation of historical and exam elements from today's encounter can be found in the full encounter summary report (not reduplicated in this progress note). I personally obtained the chief complaint(s) and history of present illness. I confirmed and edited as necessary the review of systems, past medical/surgical history, family history, social history, and examination findings as documented by others; and I examined the patient myself. I personally reviewed the relevant tests, images, and reports as documented above. I formulated and edited as necessary the assessment and plan and discussed the findings and management plan with the patient. Crista Agrawal MD

## 2025-04-02 ENCOUNTER — OFFICE VISIT (OUTPATIENT)
Dept: OPHTHALMOLOGY | Facility: CLINIC | Age: 70
End: 2025-04-02
Payer: COMMERCIAL

## 2025-04-02 DIAGNOSIS — H04.202 LEFT EPIPHORA: ICD-10-CM

## 2025-04-02 DIAGNOSIS — H04.562 STENOSIS OF LEFT LACRIMAL PUNCTUM: Primary | ICD-10-CM

## 2025-04-02 ASSESSMENT — VISUAL ACUITY
METHOD: SNELLEN - LINEAR
OD_SC+: -1
OS_SC: 20/25
OD_SC: 20/20
OS_SC+: +2

## 2025-04-02 ASSESSMENT — TONOMETRY
OS_IOP_MMHG: 13
IOP_METHOD: ICARE
OD_IOP_MMHG: 11

## 2025-04-02 NOTE — PROGRESS NOTES
"     Chief Complaint(s) and History of Present Illness(es)     Post Op (Ophthalmology) Left Eye            Laterality: left eye          Comments    S/P left lower eyelid punctoplasty and silicone stent 3/5/2025. She   reports that the tearing has not been \"that bad\".  It is significantly improved compared to prior to the procedure. No specific concerns   today. She has completed the post op drops as directed.       Assessment & Plan     Alee Coyle is a 69 year old female with the following diagnoses:   Encounter Diagnoses   Name Primary?    Stenosis of left lacrimal punctum Yes    Left epiphora        Stent was removed today. Lower punctum is widely patent. Nice low tear lake. She can use maxitrol drops which she has at home three times a day today and tomorrow then stop. Follow up with any recurrent tearing.       Patient disposition:   No follow-ups on file.        Attending Physician Attestation: Complete documentation of historical and exam elements from today's encounter can be found in the full encounter summary report (not reduplicated in this progress note). I personally obtained the chief complaint(s) and history of present illness. I confirmed and edited as necessary the review of systems, past medical/surgical history, family history, social history, and examination findings as documented by others; and I examined the patient myself. I personally reviewed the relevant tests, images, and reports as documented above. I formulated and edited as necessary the assessment and plan and discussed the findings and management plan with the patient.  -Crista Agrawal MD      "

## 2025-04-02 NOTE — NURSING NOTE
"Chief Complaints and History of Present Illnesses   Patient presents with    Post Op (Ophthalmology) Left Eye     Chief Complaint(s) and History of Present Illness(es)       Post Op (Ophthalmology) Left Eye              Laterality: left eye              Comments    S/P left lower eyelid punctoplasty and silicone stent 3/5/2025. She reports that the tearing has not been \"that bad\". No specific concerns today. She has completed the post op drops as directed.                  "

## 2025-06-27 NOTE — NURSING NOTE
"Chief Complaints and History of Present Illnesses   Patient presents with    Tearing Left Eye     Chief Complaint(s) and History of Present Illness(es)       Tearing Left Eye              Laterality: left eye              Comments    Pt referred by Dr. Drummond for evaluation of tearing of the left eye with possible punctual stenosis. Pt states that the tears pool in the eye, and then run down her cheek. She has been aware of this for the last 2 years.     No previous ocular surgeries. No heart conditions, does have a \"spot on lung\" that is being watched. Vit D, no other blood thinners.                  "
yes